# Patient Record
Sex: FEMALE | Race: WHITE | Employment: FULL TIME | URBAN - METROPOLITAN AREA
[De-identification: names, ages, dates, MRNs, and addresses within clinical notes are randomized per-mention and may not be internally consistent; named-entity substitution may affect disease eponyms.]

---

## 2024-03-29 ENCOUNTER — HOSPITAL ENCOUNTER (EMERGENCY)
Age: 54
Discharge: HOME | End: 2024-03-29
Payer: COMMERCIAL

## 2024-03-29 VITALS
BODY MASS INDEX: 22.5 KG/M2 | HEART RATE: 77 BPM | OXYGEN SATURATION: 98 % | DIASTOLIC BLOOD PRESSURE: 68 MMHG | SYSTOLIC BLOOD PRESSURE: 103 MMHG | WEIGHT: 140 LBS | HEIGHT: 66 IN | RESPIRATION RATE: 16 BRPM | TEMPERATURE: 98.7 F

## 2024-03-29 DIAGNOSIS — H92.03 OTALGIA OF BOTH EARS: Primary | ICD-10-CM

## 2024-03-29 DIAGNOSIS — R51.9 NONINTRACTABLE HEADACHE, UNSPECIFIED CHRONICITY PATTERN, UNSPECIFIED HEADACHE TYPE: ICD-10-CM

## 2024-03-29 LAB
FLUAV RNA RESP QL NAA+PROBE: NOT DETECTED
FLUBV RNA RESP QL NAA+PROBE: NOT DETECTED
SARS-COV-2 RNA RESP QL NAA+PROBE: NOT DETECTED

## 2024-03-29 PROCEDURE — 6360000002 HC RX W HCPCS: Performed by: PHYSICIAN ASSISTANT

## 2024-03-29 PROCEDURE — 6370000000 HC RX 637 (ALT 250 FOR IP): Performed by: PHYSICIAN ASSISTANT

## 2024-03-29 PROCEDURE — 99283 EMERGENCY DEPT VISIT LOW MDM: CPT

## 2024-03-29 PROCEDURE — 87636 SARSCOV2 & INF A&B AMP PRB: CPT

## 2024-03-29 RX ORDER — BUTALBITAL, ACETAMINOPHEN, CAFFEINE AND CODEINE PHOSPHATE 300; 50; 40; 30 MG/1; MG/1; MG/1; MG/1
1 CAPSULE ORAL EVERY 4 HOURS PRN
Qty: 10 CAPSULE | Refills: 0 | Status: SHIPPED | OUTPATIENT
Start: 2024-03-29 | End: 2024-04-08

## 2024-03-29 RX ORDER — DEXAMETHASONE 4 MG/1
4 TABLET ORAL
Qty: 3 TABLET | Refills: 0 | Status: SHIPPED | OUTPATIENT
Start: 2024-03-29 | End: 2024-04-01

## 2024-03-29 RX ORDER — BUTALBITAL, ACETAMINOPHEN AND CAFFEINE 50; 325; 40 MG/1; MG/1; MG/1
1 TABLET ORAL EVERY 4 HOURS PRN
Status: DISCONTINUED | OUTPATIENT
Start: 2024-03-29 | End: 2024-03-29 | Stop reason: HOSPADM

## 2024-03-29 RX ORDER — DEXAMETHASONE 4 MG/1
10 TABLET ORAL ONCE
Status: COMPLETED | OUTPATIENT
Start: 2024-03-29 | End: 2024-03-29

## 2024-03-29 RX ORDER — PSEUDOEPHEDRINE HCL 30 MG
30 TABLET ORAL EVERY 6 HOURS PRN
Qty: 20 TABLET | Refills: 1 | Status: SHIPPED | OUTPATIENT
Start: 2024-03-29

## 2024-03-29 RX ADMIN — DEXAMETHASONE 10 MG: 4 TABLET ORAL at 17:31

## 2024-03-29 RX ADMIN — BUTALBITAL, ACETAMINOPHEN AND CAFFEINE 1 TABLET: 325; 50; 40 TABLET ORAL at 16:25

## 2024-03-29 ASSESSMENT — PAIN SCALES - GENERAL
PAINLEVEL_OUTOF10: 7
PAINLEVEL_OUTOF10: 7

## 2024-03-29 ASSESSMENT — PAIN - FUNCTIONAL ASSESSMENT: PAIN_FUNCTIONAL_ASSESSMENT: 0-10

## 2024-03-29 NOTE — ED PROVIDER NOTES
medications in ED:  Medications   butalbital-acetaminophen-caffeine (FIORICET, ESGIC) per tablet 1 tablet (1 tablet Oral Given 3/29/24 1625)   dexAMETHasone (DECADRON) tablet 10 mg (10 mg Oral Given 3/29/24 1731)     Sepsis Consideration:  Is this patient to be included in the SEP-1 Core Measure due to severe sepsis or septic shock?   No   Exclusion criteria - the patient is NOT to be included for SEP-1 Core Measure due to:  Viral etiology found or highly suspected (including COVID-19) without concomitant bacterial infection    Records Reviewed:   Brief chart review performed relevant records identified..    Chronic conditions affecting care:   Tobacco abuse.   has no past medical history on file.    Social Determinants:   None identified.    Consults:   None necessary at this time.    Reassessment:      Patient given dose of Fioricet with improvement of headache.  She was also started on Decadron.  Vitals have remained stable.    MDM/Disposition Considerations:   Briefly Michael Reyes presents for evaluation of headache and ear pressure.  Rapid COVID and flu swabs were negative.  Suspect nasal congestion.  Patient given Decadron and Fioricet with improvement of symptoms.  No evidence for acute bacterial infection at this time and patient will be discharged on steroids and nasal decongestants with recommendations for smoking cessation and education was provided.  Will be provided with ENT referral.  We have discussed return precautions as well as recommendations for follow-up otherwise and patient is in agreement and comfortable with discharge.    Critical Care Time:   0 Minutes of critical care time spent not including separately billable procedures.    DDx:  I estimate there is LOW risk for a ANAPHYLAXIS, DEEP SPACE INFECTION (e.g., MANOJ’S ANGINA OR RETROPHARYNGEAL ABSCESS), EPIGLOTTITIS, MENINGITIS, or AIRWAY COMPROMISE, thus I consider the discharge disposition reasonable. Also, there is no evidence or

## 2024-04-03 NOTE — PROGRESS NOTES
[facility]  DIVISION OF OTOLARYNGOLOGY- HEAD & NECK SURGERY  CONSULT    Patient Name: Michael Reyes  Medical Record Number:  [id number]  Primary Care Physician:  Elisha Lino  Date of Consultation: 4/5/2024    Chief Complaint:   Chief Complaint   Patient presents with    New Patient     ER f/u; HA dizziness x 2 mo.  Was told in ER has fluid behind ear.  Given steroids.        HISTORY OF PRESENT ILLNESS  Michael is a(n) 53 y.o. female who presents for evaluation of headaches and dizziness.  She states that these have been going on for approximately 2 months.  She cannot get any relief.  She has a family history of aneurysms.  She does not note any change in her hearing however she states that the quality of sound has changed.  She was seen in the ER on 29 March and given steroids and Fioricet without relief of her symptoms.  She was also given pseudoephedrine as a decongestant.  There is no problem list on file for this patient.    No past surgical history on file.  No family history on file.  Social History     Socioeconomic History    Marital status:      Spouse name: Not on file    Number of children: Not on file    Years of education: Not on file    Highest education level: Not on file   Occupational History    Not on file   Tobacco Use    Smoking status: Every Day     Current packs/day: 0.50     Average packs/day: 0.5 packs/day for 20.0 years (10.0 ttl pk-yrs)     Types: Cigarettes     Start date: 4/5/2004    Smokeless tobacco: Never   Substance and Sexual Activity    Alcohol use: Not on file    Drug use: Not on file    Sexual activity: Not on file   Other Topics Concern    Not on file   Social History Narrative    Not on file     Social Determinants of Health     Financial Resource Strain: Not on file   Food Insecurity: Not on file   Transportation Needs: Not on file   Physical Activity: Not on file   Stress: Not on file   Social Connections: Not on file   Intimate Partner Violence: Not on

## 2024-04-05 ENCOUNTER — TELEPHONE (OUTPATIENT)
Age: 54
End: 2024-04-05

## 2024-04-05 ENCOUNTER — APPOINTMENT (OUTPATIENT)
Dept: CT IMAGING | Age: 54
End: 2024-04-05
Attending: EMERGENCY MEDICINE
Payer: COMMERCIAL

## 2024-04-05 ENCOUNTER — HOSPITAL ENCOUNTER (EMERGENCY)
Age: 54
Discharge: HOME | End: 2024-04-05
Attending: EMERGENCY MEDICINE
Payer: COMMERCIAL

## 2024-04-05 ENCOUNTER — OFFICE VISIT (OUTPATIENT)
Dept: ENT CLINIC | Age: 54
End: 2024-04-05
Payer: COMMERCIAL

## 2024-04-05 VITALS
DIASTOLIC BLOOD PRESSURE: 66 MMHG | HEART RATE: 63 BPM | HEIGHT: 66 IN | BODY MASS INDEX: 22.5 KG/M2 | SYSTOLIC BLOOD PRESSURE: 103 MMHG | WEIGHT: 140 LBS

## 2024-04-05 VITALS
OXYGEN SATURATION: 98 % | WEIGHT: 140 LBS | RESPIRATION RATE: 21 BRPM | SYSTOLIC BLOOD PRESSURE: 98 MMHG | HEIGHT: 66 IN | DIASTOLIC BLOOD PRESSURE: 67 MMHG | TEMPERATURE: 98.3 F | HEART RATE: 57 BPM | BODY MASS INDEX: 22.5 KG/M2

## 2024-04-05 DIAGNOSIS — R42 DIZZINESS: Primary | ICD-10-CM

## 2024-04-05 DIAGNOSIS — M26.623 BILATERAL TEMPOROMANDIBULAR JOINT PAIN: Primary | ICD-10-CM

## 2024-04-05 DIAGNOSIS — R51.9 NONINTRACTABLE HEADACHE, UNSPECIFIED CHRONICITY PATTERN, UNSPECIFIED HEADACHE TYPE: ICD-10-CM

## 2024-04-05 DIAGNOSIS — R51.9 ACUTE NONINTRACTABLE HEADACHE, UNSPECIFIED HEADACHE TYPE: ICD-10-CM

## 2024-04-05 DIAGNOSIS — R51.9 NONINTRACTABLE HEADACHE, UNSPECIFIED CHRONICITY PATTERN, UNSPECIFIED HEADACHE TYPE: Primary | ICD-10-CM

## 2024-04-05 LAB
ANION GAP SERPL CALCULATED.3IONS-SCNC: 10 MMOL/L (ref 3–16)
BASOPHILS # BLD: 0.1 K/UL (ref 0–0.2)
BASOPHILS NFR BLD: 1.2 %
BUN SERPL-MCNC: 7 MG/DL (ref 7–20)
CALCIUM SERPL-MCNC: 8.4 MG/DL (ref 8.3–10.6)
CHLORIDE SERPL-SCNC: 106 MMOL/L (ref 99–110)
CO2 SERPL-SCNC: 22 MMOL/L (ref 21–32)
CREAT SERPL-MCNC: 0.6 MG/DL (ref 0.6–1.1)
DEPRECATED RDW RBC AUTO: 14.7 % (ref 12.4–15.4)
EOSINOPHIL # BLD: 0.1 K/UL (ref 0–0.6)
EOSINOPHIL NFR BLD: 1.6 %
GFR SERPLBLD CREATININE-BSD FMLA CKD-EPI: >90 ML/MIN/{1.73_M2}
GLUCOSE SERPL-MCNC: 88 MG/DL (ref 70–99)
HCT VFR BLD AUTO: 35.6 % (ref 36–48)
HGB BLD-MCNC: 11.6 G/DL (ref 12–16)
LYMPHOCYTES # BLD: 1.7 K/UL (ref 1–5.1)
LYMPHOCYTES NFR BLD: 29.3 %
MCH RBC QN AUTO: 28.4 PG (ref 26–34)
MCHC RBC AUTO-ENTMCNC: 32.6 G/DL (ref 31–36)
MCV RBC AUTO: 87.4 FL (ref 80–100)
MONOCYTES # BLD: 0.4 K/UL (ref 0–1.3)
MONOCYTES NFR BLD: 6.6 %
NEUTROPHILS # BLD: 3.5 K/UL (ref 1.7–7.7)
NEUTROPHILS NFR BLD: 61.3 %
PLATELET # BLD AUTO: 254 K/UL (ref 135–450)
PMV BLD AUTO: 8.1 FL (ref 5–10.5)
POTASSIUM SERPL-SCNC: 4 MMOL/L (ref 3.5–5.1)
RBC # BLD AUTO: 4.07 M/UL (ref 4–5.2)
SODIUM SERPL-SCNC: 138 MMOL/L (ref 136–145)
WBC # BLD AUTO: 5.7 K/UL (ref 4–11)

## 2024-04-05 PROCEDURE — 99285 EMERGENCY DEPT VISIT HI MDM: CPT

## 2024-04-05 PROCEDURE — 85025 COMPLETE CBC W/AUTO DIFF WBC: CPT

## 2024-04-05 PROCEDURE — 2580000003 HC RX 258: Performed by: EMERGENCY MEDICINE

## 2024-04-05 PROCEDURE — 96375 TX/PRO/DX INJ NEW DRUG ADDON: CPT

## 2024-04-05 PROCEDURE — 6360000004 HC RX CONTRAST MEDICATION: Performed by: EMERGENCY MEDICINE

## 2024-04-05 PROCEDURE — 70450 CT HEAD/BRAIN W/O DYE: CPT

## 2024-04-05 PROCEDURE — 96374 THER/PROPH/DIAG INJ IV PUSH: CPT

## 2024-04-05 PROCEDURE — 70498 CT ANGIOGRAPHY NECK: CPT

## 2024-04-05 PROCEDURE — 99203 OFFICE O/P NEW LOW 30 MIN: CPT | Performed by: STUDENT IN AN ORGANIZED HEALTH CARE EDUCATION/TRAINING PROGRAM

## 2024-04-05 PROCEDURE — 6370000000 HC RX 637 (ALT 250 FOR IP): Performed by: EMERGENCY MEDICINE

## 2024-04-05 PROCEDURE — 6360000002 HC RX W HCPCS: Performed by: EMERGENCY MEDICINE

## 2024-04-05 PROCEDURE — 80048 BASIC METABOLIC PNL TOTAL CA: CPT

## 2024-04-05 RX ORDER — 0.9 % SODIUM CHLORIDE 0.9 %
1000 INTRAVENOUS SOLUTION INTRAVENOUS ONCE
Status: COMPLETED | OUTPATIENT
Start: 2024-04-05 | End: 2024-04-05

## 2024-04-05 RX ORDER — PROCHLORPERAZINE MALEATE 10 MG
10 TABLET ORAL EVERY 8 HOURS PRN
Qty: 120 TABLET | Refills: 1 | Status: SHIPPED | OUTPATIENT
Start: 2024-04-05 | End: 2024-06-24

## 2024-04-05 RX ORDER — ACETAMINOPHEN 500 MG
1000 TABLET ORAL ONCE
Status: COMPLETED | OUTPATIENT
Start: 2024-04-05 | End: 2024-04-05

## 2024-04-05 RX ORDER — HYDROMORPHONE HYDROCHLORIDE 1 MG/ML
1 INJECTION, SOLUTION INTRAMUSCULAR; INTRAVENOUS; SUBCUTANEOUS ONCE
Status: DISCONTINUED | OUTPATIENT
Start: 2024-04-05 | End: 2024-04-05

## 2024-04-05 RX ORDER — PROCHLORPERAZINE EDISYLATE 5 MG/ML
10 INJECTION INTRAMUSCULAR; INTRAVENOUS ONCE
Status: COMPLETED | OUTPATIENT
Start: 2024-04-05 | End: 2024-04-05

## 2024-04-05 RX ORDER — HYDROMORPHONE HYDROCHLORIDE 1 MG/ML
0.5 INJECTION, SOLUTION INTRAMUSCULAR; INTRAVENOUS; SUBCUTANEOUS
Status: DISCONTINUED | OUTPATIENT
Start: 2024-04-05 | End: 2024-04-05

## 2024-04-05 RX ORDER — METOCLOPRAMIDE HYDROCHLORIDE 5 MG/ML
10 INJECTION INTRAMUSCULAR; INTRAVENOUS ONCE
Status: DISCONTINUED | OUTPATIENT
Start: 2024-04-05 | End: 2024-04-05

## 2024-04-05 RX ORDER — DIPHENHYDRAMINE HYDROCHLORIDE 50 MG/ML
25 INJECTION INTRAMUSCULAR; INTRAVENOUS ONCE
Status: COMPLETED | OUTPATIENT
Start: 2024-04-05 | End: 2024-04-05

## 2024-04-05 RX ADMIN — IOPAMIDOL 75 ML: 755 INJECTION, SOLUTION INTRAVENOUS at 11:16

## 2024-04-05 RX ADMIN — DIPHENHYDRAMINE HYDROCHLORIDE 25 MG: 50 INJECTION INTRAMUSCULAR; INTRAVENOUS at 10:08

## 2024-04-05 RX ADMIN — PROCHLORPERAZINE EDISYLATE 10 MG: 5 INJECTION INTRAMUSCULAR; INTRAVENOUS at 10:08

## 2024-04-05 RX ADMIN — SODIUM CHLORIDE 1000 ML: 9 INJECTION, SOLUTION INTRAVENOUS at 10:11

## 2024-04-05 RX ADMIN — ACETAMINOPHEN 1000 MG: 500 TABLET ORAL at 10:09

## 2024-04-05 ASSESSMENT — ENCOUNTER SYMPTOMS
RHINORRHEA: 0
NAUSEA: 0
VOMITING: 0
EYE PAIN: 0
COUGH: 0
SHORTNESS OF BREATH: 0

## 2024-04-05 ASSESSMENT — PAIN DESCRIPTION - LOCATION: LOCATION: HEAD

## 2024-04-05 ASSESSMENT — PAIN SCALES - GENERAL
PAINLEVEL_OUTOF10: 7
PAINLEVEL_OUTOF10: 5

## 2024-04-05 ASSESSMENT — PAIN - FUNCTIONAL ASSESSMENT: PAIN_FUNCTIONAL_ASSESSMENT: 0-10

## 2024-04-05 NOTE — TELEPHONE ENCOUNTER
Pt sister called to request appointment. Pt was seen in ED, sent there by Dr. Stephenson. Pt has no referral on file. Told sister pt needs referral.  Pt sister will have Dr. Stephenson send over referral   Will watch for referral and schedule accordingly.

## 2024-04-05 NOTE — ED NOTES
Writer reviewed discharge instructions, medications, and follow-up with Neurology patient and family verbalized understanding. Patient's IV removed with no complications with dressing in place. Patient stable, ambulatory with steady gait, GCS 15, no signs/ symptoms of acute distress, respirations unlabored, and with family.

## 2024-04-05 NOTE — ED PROVIDER NOTES
Emergency Department Attending Provider Note  Location: [facility]  ED  4/5/2024     Patient Identification  Michael Reyes is a 53 y.o. female      Michael Reyes was evaluated in the Emergency Department for dizziness and HA past two weeks. Seen in ER and referred to ENT, ENT referred back to ER.     HPI: As noted above    Physical Exam: In no apparent distress vitals notable for slightly soft blood pressure systolic 100 otherwise within normal limits.  On neuroexam,NIH stroke scale 0, no cranial nerve deficits appreciated, strength 5 out of 5 all extremities, sensation is intact, no central ataxia, no cerebellar signs present, no deviation of vertical skew.      Patient seen and evaluated.  Relevant records reviewed.  Patient presents with symptoms noted above.  Overall well-appearing no acute distress, hemodynamically stable.  No meningismus no neurological deficits on exam.  CT head CTA head and neck negative for any definitive acute process.  Radiology reports possible hygroma versus subacute subdural hematomas.  Neurosurgery was consulted and resident physician discussed the case with him after neurosurgery review of images I am told they feel that this is consistent with a hygroma and recommended discharge outpatient follow-up with neurology and MRI.  Patient treated with migraine cocktail with improvement of symptoms.  She is appropriate for discharge at this time.    Although initial history and physical exam information was obtained by SHERYL/NPP/MD/DO (who also dictated a record of this visit), I personally saw the patient and made/approved the management plan and take responsibility for patient management. I, Dr. Tamayo, am the primary clinician of record.     I personally saw the patient and independently provided 10 minutes of non-concurrent critical care out of the total shared critical care time excluding separately billed procedures.    This chart was generated in part by

## 2024-04-05 NOTE — ED PROVIDER NOTES
EMERGENCY DEPARTMENT ENCOUNTER     [facility]  ED     Pt Name: Michael Reyes   MRN: [id number]   Birthdate 1970   Date of evaluation: 4/5/2024   Provider: Jesús Rose DO   PCP: Elisha Lino   Note Started: 9:15 AM EDT 4/5/24     CHIEF COMPLAINT     Chief Complaint   Patient presents with    Headache     Dizziness and headache for a couple weeks.  Patient was seen in ED last Saturday and told she had fluid behind her ears, went to ENT this morning.  Patient has outpatient CT scan ordered but the ENT told her if she came to the ED she could get her CT scan faster.  Pt states she has a hx of aneurysms in her family and she feels like she has been more \"off\" lately.         HISTORY OF PRESENT ILLNESS:  History from : Patient and Mother    Limitations to history : None     Michael Reyes is a 53 y.o. female with a history of migraines who presents with a headache and dizziness. Patient has dizziness that has been getting worse over the past couple weeks. She states that she came to the emergency department on Saturday, and was told that she had fluid behind her ears.  She saw ENT this morning and was told that she needed to come to the emergency department to get a CT scan. Patient denies fever, nausea, vomiting, shortness of breath, chest pain, visual changes, tinnitus, ear pressure/fullness, hearing loss, numbness and tingling to her arms/legs, and incontinence.  She admits to chills, dizziness (only when she is walking), headache (band across her forehead), phonophobia, photophobia, tremulousness, and fatigue. She describes her headache as a dull-ache; rates it 7/10. She states that voices sometimes sounds like there is a robotic-filter on them. She took Topamax and Fioricet with codeine before arriving the emergency department.     Nursing Notes were all reviewed and agreed with or any disagreements were addressed in the HPI.     ROS: Positives and Pertinent

## 2024-04-05 NOTE — ED NOTES
Called [facility] @0721.  Per; Jesús Rose DO.  RE; abnormal head CT, recommendations for further work up.  Jace Valdes NP responded @4070.

## 2024-04-16 ENCOUNTER — TELEPHONE (OUTPATIENT)
Dept: CASE MANAGEMENT | Age: 54
End: 2024-04-16

## 2024-04-16 NOTE — TELEPHONE ENCOUNTER
Imaging report CTA Head Neck 4/5/24 with f/u imaging recommendations sent to Elisha Lino PCP    Lesly SAMUEL(R)  Patient Navigator  Incidentals/Lung Navigation  [email or web address]

## 2024-04-22 NOTE — TELEPHONE ENCOUNTER
Npt ref by Mitts. Acute non intractable headache.   Pt was recently ip and had imaging done on 4/5/24.    Please review imaging and advise on scheduling.